# Patient Record
Sex: FEMALE | Race: WHITE | URBAN - METROPOLITAN AREA
[De-identification: names, ages, dates, MRNs, and addresses within clinical notes are randomized per-mention and may not be internally consistent; named-entity substitution may affect disease eponyms.]

---

## 2018-07-10 ENCOUNTER — HOSPITAL ENCOUNTER (OUTPATIENT)
Dept: GENERAL RADIOLOGY | Age: 23
Discharge: HOME | End: 2018-07-10
Attending: NURSE PRACTITIONER
Payer: COMMERCIAL

## 2018-07-10 ENCOUNTER — OFFICE VISIT (OUTPATIENT)
Dept: FAMILY MEDICINE CLINIC | Facility: CLINIC | Age: 23
End: 2018-07-10

## 2018-07-10 ENCOUNTER — TELEPHONE (OUTPATIENT)
Dept: FAMILY MEDICINE CLINIC | Facility: CLINIC | Age: 23
End: 2018-07-10

## 2018-07-10 VITALS
BODY MASS INDEX: 39.68 KG/M2 | SYSTOLIC BLOOD PRESSURE: 120 MMHG | TEMPERATURE: 99 F | HEART RATE: 92 BPM | DIASTOLIC BLOOD PRESSURE: 80 MMHG | RESPIRATION RATE: 20 BRPM | HEIGHT: 72 IN | WEIGHT: 293 LBS

## 2018-07-10 DIAGNOSIS — S99.911A INJURY OF RIGHT ANKLE, INITIAL ENCOUNTER: ICD-10-CM

## 2018-07-10 DIAGNOSIS — IMO0001 CLASS 3 OBESITY WITH BODY MASS INDEX (BMI) OF 50.0 TO 59.9 IN ADULT, UNSPECIFIED OBESITY TYPE, UNSPECIFIED WHETHER SERIOUS COMORBIDITY PRESENT: ICD-10-CM

## 2018-07-10 DIAGNOSIS — S99.912A INJURY OF LEFT ANKLE, INITIAL ENCOUNTER: Primary | ICD-10-CM

## 2018-07-10 DIAGNOSIS — S82.831A CLOSED FRACTURE OF DISTAL END OF RIGHT FIBULA, UNSPECIFIED FRACTURE MORPHOLOGY, INITIAL ENCOUNTER: ICD-10-CM

## 2018-07-10 DIAGNOSIS — S99.912A INJURY OF LEFT ANKLE, INITIAL ENCOUNTER: ICD-10-CM

## 2018-07-10 PROBLEM — G47.33 OSA (OBSTRUCTIVE SLEEP APNEA): Status: ACTIVE | Noted: 2017-07-30

## 2018-07-10 PROBLEM — F32.A DEPRESSION: Status: ACTIVE | Noted: 2017-07-29

## 2018-07-10 PROCEDURE — 73610 X-RAY EXAM OF ANKLE: CPT | Performed by: NURSE PRACTITIONER

## 2018-07-10 PROCEDURE — 99214 OFFICE O/P EST MOD 30 MIN: CPT | Performed by: NURSE PRACTITIONER

## 2018-07-10 PROCEDURE — 73590 X-RAY EXAM OF LOWER LEG: CPT | Performed by: NURSE PRACTITIONER

## 2018-07-10 RX ORDER — LAMOTRIGINE 200 MG/1
200 TABLET ORAL DAILY
COMMUNITY
Start: 2018-06-06 | End: 2020-08-03

## 2018-07-10 RX ORDER — FLUOXETINE HYDROCHLORIDE 20 MG/1
40 CAPSULE ORAL DAILY
COMMUNITY
Start: 2018-06-06

## 2018-07-10 RX ORDER — OMEPRAZOLE 20 MG/1
20 CAPSULE, DELAYED RELEASE ORAL DAILY
COMMUNITY
Start: 2012-04-23 | End: 2020-08-03

## 2018-07-10 RX ORDER — TOPIRAMATE 25 MG/1
25 TABLET ORAL 2 TIMES DAILY
COMMUNITY
Start: 2018-06-06 | End: 2020-08-03

## 2018-07-10 RX ORDER — NORTRIPTYLINE HYDROCHLORIDE 10 MG/1
10 CAPSULE ORAL NIGHTLY
COMMUNITY
Start: 2018-06-06 | End: 2020-12-16

## 2018-07-10 NOTE — PATIENT INSTRUCTIONS
Stay off of your feet,  ice to ankles, elevate, ankle braces, Aleve 1-2 pills twice a day with food. Follow-up with Dr. Arielle Harrison at [facility] July 11 at 1:15 PM.  Please bring a copy of your x-ray to your visit.     Follow-up with weight loss clinic when

## 2018-07-10 NOTE — TELEPHONE ENCOUNTER
----- Message from RAYNA Tapia sent at 7/10/2018 12:57 PM CDT -----  Please notify patient that in addition to her right fibular fracture she also has an avulsion fracture to her right tibia.   She should not bear any weight on her right leg follow

## 2018-07-10 NOTE — PROGRESS NOTES
Marilin Sharma is a 21year old female. Patient presents with: Ankle Pain: bilat.   R>L  Fall: fell on Sunday -outdoors-  stepped into a hold in the ground and twisted ankles  Knee Pain: right knee      HPI:   Complaints of fell in a hole in the ground nausea, vomiting, diarrhea   MUSCULOSKELETAL: See HPI  NEURO: See HPI  PSYCH:denies complaints     EXAM:   /80 (BP Location: Right arm, Patient Position: Sitting, Cuff Size: thigh)   Pulse 92   Temp 98.8 °F (37.1 °C) (Tympanic)   Resp 20   Ht 72\" right tibia.

## 2018-07-10 NOTE — TELEPHONE ENCOUNTER
Pt informed, verbalized understanding. Referral with copy of x-ray reports faxed to Dr. Arias.    #[phone]

## 2018-07-11 ENCOUNTER — TELEPHONE (OUTPATIENT)
Dept: FAMILY MEDICINE CLINIC | Facility: CLINIC | Age: 23
End: 2018-07-11

## 2018-07-16 ENCOUNTER — OFFICE VISIT (OUTPATIENT)
Dept: FAMILY MEDICINE CLINIC | Facility: CLINIC | Age: 23
End: 2018-07-16

## 2018-07-16 VITALS
HEIGHT: 72 IN | DIASTOLIC BLOOD PRESSURE: 78 MMHG | BODY MASS INDEX: 39.68 KG/M2 | TEMPERATURE: 99 F | SYSTOLIC BLOOD PRESSURE: 118 MMHG | HEART RATE: 106 BPM | RESPIRATION RATE: 18 BRPM | WEIGHT: 293 LBS

## 2018-07-16 DIAGNOSIS — S82.831A CLOSED FRACTURE OF DISTAL END OF RIGHT FIBULA, UNSPECIFIED FRACTURE MORPHOLOGY, INITIAL ENCOUNTER: ICD-10-CM

## 2018-07-16 DIAGNOSIS — Z01.818 PREOPERATIVE EXAMINATION: Primary | ICD-10-CM

## 2018-07-16 DIAGNOSIS — S93.431A ANKLE SYNDESMOSIS DISRUPTION, RIGHT, INITIAL ENCOUNTER: ICD-10-CM

## 2018-07-16 PROCEDURE — 99243 OFF/OP CNSLTJ NEW/EST LOW 30: CPT | Performed by: FAMILY MEDICINE

## 2018-07-16 NOTE — PROGRESS NOTES
[facility]  PRE-OP NOTE    Chief Complaint:   Patient presents with:  Pre-Op Exam  Preop requested by Dr Nicolette Wagner DPM    HPI:   Natalie Austin is a 21year old female with a hx of fibular fracture, displaced tibia and syndesmosis R double vision or yellow sclerae. Ears, Nose, Throat:  Denies hearing loss, sneezing, congestion, runny nose or sore throat. INTEGUMENTARY:  Denies rashes, itching, skin lesion, or excessive skin dryness.   CARDIOVASCULAR:  Denies chest pain, chest pressure no JVD, no carotid bruit, no thyromegaly. SKIN: No rashes, no skin lesion, no bruising, good turgor. HEART:  Regular rate and rhythm, no murmurs, rubs or gallops. LUNGS: Clear to auscultation bilterally, no rales/rhonchi/wheezing. CHEST: No tenderness. Obstructive sleep apnea     JULIANA (obstructive sleep apnea)     Periodic limb movement disorder     Sleep disturbance     Abnormal respiratory rate      Se Tellez MD  7/16/2018  2:55 PM

## 2018-07-16 NOTE — PATIENT INSTRUCTIONS
After today's assessment  patient is at optimum health for surgery and relatively at low risk. There are no contraindication for procedure. Recommend to avoid any aspirin, Aleve or ibuprofen before surgery.

## 2018-07-18 ENCOUNTER — TELEPHONE (OUTPATIENT)
Dept: FAMILY MEDICINE CLINIC | Facility: CLINIC | Age: 23
End: 2018-07-18

## 2019-06-03 ENCOUNTER — TELEPHONE (OUTPATIENT)
Dept: FAMILY MEDICINE CLINIC | Facility: CLINIC | Age: 24
End: 2019-06-03

## 2019-06-03 ENCOUNTER — OFFICE VISIT (OUTPATIENT)
Dept: FAMILY MEDICINE CLINIC | Facility: CLINIC | Age: 24
End: 2019-06-03
Payer: COMMERCIAL

## 2019-06-03 VITALS
SYSTOLIC BLOOD PRESSURE: 124 MMHG | HEART RATE: 90 BPM | TEMPERATURE: 98 F | HEIGHT: 72 IN | WEIGHT: 293 LBS | OXYGEN SATURATION: 98 % | BODY MASS INDEX: 39.68 KG/M2 | RESPIRATION RATE: 20 BRPM | DIASTOLIC BLOOD PRESSURE: 80 MMHG

## 2019-06-03 DIAGNOSIS — R05.9 COUGH: ICD-10-CM

## 2019-06-03 DIAGNOSIS — M94.0 COSTOCHONDRITIS: Primary | ICD-10-CM

## 2019-06-03 PROCEDURE — 99214 OFFICE O/P EST MOD 30 MIN: CPT | Performed by: NURSE PRACTITIONER

## 2019-06-03 NOTE — PROGRESS NOTES
HPI:    Patient ID: Jaylyn Francois is a 25year old female. HPI     Having upper chest pain since Thursday, 5/30. Made it hard to breath. Yesterday it was hard to talk. Has taken extra strength ibuprofen. Worse with laying down.    Was seen a year Constitutional: She is oriented to person, place, and time. She appears well-developed and well-nourished. No distress. HENT:   Head: Normocephalic and atraumatic.    Right Ear: Hearing, tympanic membrane, external ear and ear canal normal.   Left Ear: He

## 2019-06-03 NOTE — TELEPHONE ENCOUNTER
Pt states she is having chest pain since Thursday which is worse when she lays down which will also cause her to cough. Pt denies left arm numbness, V/D/N, sweating, jaw pain, back pain and no headaches.     Schedule appt with Milagro today but informed pt

## 2019-06-04 NOTE — PATIENT INSTRUCTIONS
Ibuprofen 400 mg three times a day for the pain. Get CXR at [facility]. Will base follow-up on the test results. Otherwise follow-up as needed.

## 2019-06-07 ENCOUNTER — TELEPHONE (OUTPATIENT)
Dept: FAMILY MEDICINE CLINIC | Facility: CLINIC | Age: 24
End: 2019-06-07

## 2019-06-07 NOTE — TELEPHONE ENCOUNTER
Please let patient know that the chest x-ray done at [facility] is normal.  Please see how she is feeling.   2.

## 2019-06-07 NOTE — TELEPHONE ENCOUNTER
Pt informed of xray results. Pt verbalized understanding. Pt stated \"I am feeling much better today\"  No other concerns or questions at this time.

## 2019-08-23 ENCOUNTER — OFFICE VISIT (OUTPATIENT)
Dept: FAMILY MEDICINE CLINIC | Facility: CLINIC | Age: 24
End: 2019-08-23
Payer: COMMERCIAL

## 2019-08-23 VITALS
HEIGHT: 72 IN | SYSTOLIC BLOOD PRESSURE: 124 MMHG | RESPIRATION RATE: 16 BRPM | WEIGHT: 293 LBS | BODY MASS INDEX: 39.68 KG/M2 | TEMPERATURE: 97 F | HEART RATE: 88 BPM | DIASTOLIC BLOOD PRESSURE: 80 MMHG

## 2019-08-23 DIAGNOSIS — E66.01 CLASS 3 SEVERE OBESITY DUE TO EXCESS CALORIES WITHOUT SERIOUS COMORBIDITY WITH BODY MASS INDEX (BMI) OF 50.0 TO 59.9 IN ADULT (HCC): ICD-10-CM

## 2019-08-23 DIAGNOSIS — Z00.00 HEALTH CARE MAINTENANCE: Primary | ICD-10-CM

## 2019-08-23 PROCEDURE — 99395 PREV VISIT EST AGE 18-39: CPT | Performed by: FAMILY MEDICINE

## 2019-08-23 NOTE — PATIENT INSTRUCTIONS
Schedule fasting labs,, 12 hours water only  Continue regular walking  Continue working with bariatric procedure

## 2019-08-23 NOTE — PROGRESS NOTES
[facility] [place]  PROGRESS NOTE  Chief Complaint:   Patient presents with:  Physical      HPI:   This is a 25year old female coming in for yearly physical she sees gynecology for pelvic and breast exams. Overall patient feels very good.   Rosanne Angles Cannabis      Comment: last used 11/2017    Family History:  Family History   Problem Relation Age of Onset   • Diabetes Mother    • Other (Other) Mother         sleep apnea   • Other (leukemia) Father    • Cancer Maternal Grandmother         breast ca   • depression or anxiety. ENDOCRINOLOGIC:  Denies excessive sweating, cold or heat intolerance, polyuria or polydipsia. ALLERGIES:  Denies allergic response, history of asthma, sneezing, hives, eczema or rhinitis.      EXAM:   /80 (BP Location: Left ar PANEL  -     ASSAY, THYROID STIM HORMONE    Class 3 severe obesity due to excess calories without serious comorbidity with body mass index (BMI) of 50.0 to 59.9 in adult ([facility] [address]  -     HEMOGLOBIN A1C    A/P: Patient here for physical and clearance for involv

## 2020-01-10 ENCOUNTER — TELEPHONE (OUTPATIENT)
Dept: FAMILY MEDICINE CLINIC | Facility: CLINIC | Age: 25
End: 2020-01-10

## 2020-08-03 ENCOUNTER — VIRTUAL PHONE E/M (OUTPATIENT)
Dept: FAMILY MEDICINE CLINIC | Facility: CLINIC | Age: 25
End: 2020-08-03

## 2020-08-03 ENCOUNTER — TELEPHONE (OUTPATIENT)
Dept: FAMILY MEDICINE CLINIC | Facility: CLINIC | Age: 25
End: 2020-08-03

## 2020-08-03 VITALS — TEMPERATURE: 99 F

## 2020-08-03 DIAGNOSIS — R06.02 SHORTNESS OF BREATH: ICD-10-CM

## 2020-08-03 DIAGNOSIS — R05.9 COUGH: Primary | ICD-10-CM

## 2020-08-03 PROCEDURE — 99442 PHONE E/M BY PHYS 11-20 MIN: CPT | Performed by: NURSE PRACTITIONER

## 2020-08-03 RX ORDER — ALBUTEROL SULFATE 90 UG/1
2 AEROSOL, METERED RESPIRATORY (INHALATION) EVERY 4 HOURS PRN
Qty: 1 INHALER | Refills: 0 | Status: SHIPPED | OUTPATIENT
Start: 2020-08-03

## 2020-08-03 RX ORDER — AZITHROMYCIN 250 MG/1
TABLET, FILM COATED ORAL
Qty: 6 TABLET | Refills: 0 | Status: SHIPPED | OUTPATIENT
Start: 2020-08-03 | End: 2020-08-07 | Stop reason: ALTCHOICE

## 2020-08-03 NOTE — TELEPHONE ENCOUNTER
Mouna RICHIE Gallego  verbally consents to a Virtual/Telephone Check-In service on 8/3/2020. Patient understands and accepts financial responsibility for any deductible, co-insurance and/or co-pays associated with this service.   Future Appointments   Date T

## 2020-08-03 NOTE — PROGRESS NOTES
Virtual Telephone Check-In    Madelyn Castro verbally consents to a Virtual/Telephone Check-In visit on 08/03/20. Patient has been referred to the [facility] website at [email or web address] to review the yearly Consent to Treat document.     Patient unders

## 2020-08-03 NOTE — TELEPHONE ENCOUNTER
Patient would like to schedule an appt with anyone this week, pt states that she has a cough, experienced shortness of breath and has been having headaches. Ok to schedule appt? Would like a call back.

## 2020-08-05 ENCOUNTER — TELEPHONE (OUTPATIENT)
Dept: FAMILY MEDICINE CLINIC | Facility: CLINIC | Age: 25
End: 2020-08-05

## 2020-08-05 NOTE — TELEPHONE ENCOUNTER
Pt states that she was asked to call with an update on her sx. She states that her cough is better but she is still having a harder time breathing even with using the inhaler.      She states that she had Covid testing done yesterday and was called with res

## 2020-08-05 NOTE — TELEPHONE ENCOUNTER
Left message with patient regarding calling with symptom update. 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 Left message for patient to call office with symptom update.

## 2020-08-05 NOTE — TELEPHONE ENCOUNTER
Would recommend an office appointment for further evaluation. Would like to check her oxygen saturation and potentially a chest x-ray as she is on both azithromycin and an albuterol HFA inhaler.   Although the patient's COVID test is negative would still h

## 2020-08-07 ENCOUNTER — OFFICE VISIT (OUTPATIENT)
Dept: FAMILY MEDICINE CLINIC | Facility: CLINIC | Age: 25
End: 2020-08-07
Payer: COMMERCIAL

## 2020-08-07 VITALS
BODY MASS INDEX: 39.68 KG/M2 | OXYGEN SATURATION: 99 % | DIASTOLIC BLOOD PRESSURE: 80 MMHG | SYSTOLIC BLOOD PRESSURE: 128 MMHG | RESPIRATION RATE: 16 BRPM | HEIGHT: 72 IN | TEMPERATURE: 98 F | HEART RATE: 83 BPM | WEIGHT: 293 LBS

## 2020-08-07 DIAGNOSIS — R05.9 COUGH: Primary | ICD-10-CM

## 2020-08-07 PROCEDURE — 3074F SYST BP LT 130 MM HG: CPT | Performed by: NURSE PRACTITIONER

## 2020-08-07 PROCEDURE — 3079F DIAST BP 80-89 MM HG: CPT | Performed by: NURSE PRACTITIONER

## 2020-08-07 PROCEDURE — 99213 OFFICE O/P EST LOW 20 MIN: CPT | Performed by: NURSE PRACTITIONER

## 2020-08-07 PROCEDURE — 3008F BODY MASS INDEX DOCD: CPT | Performed by: NURSE PRACTITIONER

## 2020-08-07 NOTE — PATIENT INSTRUCTIONS
Start antihistamine such as Claritin (plain) once a day. Start nasal fluticasone (Flonase) two sprays each nostril once a day for a week then one spray each nostril daily. Continue with albuterol inhaler as needed.   Keep pets off the bed, wash your mary

## 2020-08-07 NOTE — PROGRESS NOTES
[facility] [place]  PROGRESS NOTE  Chief Complaint:   Patient presents with:  Cough: has gotten worse in the past couple weeks  Shortness Of Breath: couple of weeks      HPI:   This is a 22year old female coming in for cough, shortness of breat • Cancer Maternal Grandmother         breast ca   • Cancer Paternal Grandmother         breast     Allergies:    Sulfamethoxazole W/*    HIVES  Trimethoprim            OTHER (SEE COMMENTS)  Current Meds:  Current Outpatient Medications   Medication Sig Last 6 Encounters:  08/07/20 : (!) 408 lb 3.2 oz (185.2 kg)  08/23/19 : (!) 427 lb (193.7 kg)  06/03/19 : (!) 429 lb (194.6 kg)  07/16/18 : (!) 419 lb 6 oz (190.2 kg)  07/10/18 : (!) 422 lb (191.4 kg)      Vital signs reviewed. Appears stated age, well groo rhinitis and see if this helps with her symptoms, start second-generation antihistamine and nasal corticosteroid.   Patient with pets in the home including cats and dogs, reviewed with patient keeping the pets out of the bedroom as able, do not let the pets Depression Screen due on 08/23/2020  Annual Physical due on 08/23/2020      Problem List:  Patient Active Problem List:     Depression     Obstructive sleep apnea     JULIANA (obstructive sleep apnea)     Periodic limb movement disorder     Sleep disturbance

## 2020-12-16 ENCOUNTER — TELEPHONE (OUTPATIENT)
Dept: FAMILY MEDICINE CLINIC | Facility: CLINIC | Age: 25
End: 2020-12-16

## 2020-12-16 NOTE — TELEPHONE ENCOUNTER
Patient states she has been having increased anxiety and SOB for the past week or so. Feels the SOB is from anxiety attacks. Patient also noticing vomiting, headaches, and sinus congestion/drainage. Appt scheduled. Video not available for patient.

## 2020-12-16 NOTE — PROGRESS NOTES
Virtual Telephone Check-In    Luann Mendiola verbally consents to a Virtual/Telephone Check-In visit on 12/16/20. Patient has been referred to the [facility] website at [email or web address] to review the yearly Consent to Treat document.     Patient unders was first starting on the fluoxetine per the patient but otherwise has not used nor needed it in recent years.       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 Patient verbalized understanding agrees. Continue with current treatment plan.       JESSICA Humphreys

## 2020-12-16 NOTE — PATIENT INSTRUCTIONS
Increase fluoxetine to 40mg a day, take two of current dose of 20mg and use dose she has.   Short course of alprazolam nightly as needed for anxiety and sleep  Alprazolam should not be used with other sedating medications nor alcohol

## 2021-01-19 ENCOUNTER — TELEPHONE (OUTPATIENT)
Dept: FAMILY MEDICINE CLINIC | Facility: CLINIC | Age: 26
End: 2021-01-19

## 2021-01-19 NOTE — TELEPHONE ENCOUNTER
Patient states she was in [address] today in [facility]. States she was not feeling well over the weekend. States her classmate took her vitals. Blood pressure/Temperature elevated.     Patient states she is having chest discomfort, but is alleviated wit

## 2021-01-19 NOTE — TELEPHONE ENCOUNTER
Patient states today while in class she mentioned to her instructor how she felt like having chest tightness, pt states that her instructor checked her bp and she thought it was high. Pt is wondering if it might be covid and if she should go get tested.  Wo

## (undated) NOTE — LETTER
09/23/19        Mouna Gallego    [facility]      Dear Ching Cesar records indicate that you have outstanding lab work and or testing that was ordered for you and has not yet been completed:  Orders Placed This Encounter      CBC